# Patient Record
(demographics unavailable — no encounter records)

---

## 2024-12-11 NOTE — PHYSICAL EXAM
[Normal Breath Sounds] : Normal breath sounds [Alert] : alert [Oriented to Person] : oriented to person [Oriented to Place] : oriented to place [Oriented to Time] : oriented to time [2+] : left 2+ [JVD] : no jugular venous distention  [Ankle Swelling (On Exam)] : not present [Abdomen Tenderness] : ~T ~M No abdominal tenderness [No Rash or Lesion] : No rash or lesion [de-identified] : Awake and alert [de-identified] :  No gross motor or sensory deficits. [de-identified] : Appropriate

## 2024-12-11 NOTE — REVIEW OF SYSTEMS
[Recent Weight Loss (___ Lbs)] : recent [unfilled] ~Ulb weight loss [Fever] : no fever [Chills] : no chills [As Noted in HPI] : as noted in HPI

## 2024-12-11 NOTE — HISTORY OF PRESENT ILLNESS
[FreeTextEntry1] : 61 yo female, referred by Dr. Hensley for long standing nausea. The patient states she developed severe nausea and decreased appetite in June.  Patient lost 5-10 Ibs in the since June but is now g  slowly gaining weight. She denies postprandial pain.  She underwent an extensive workup which was largely unremarkable. 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 The patient believes her nausea is secondary to overuse of cannabis. She reports improvement in her symptoms when she stops using marijuana. Patient has a hx of depression and anxiety.

## 2024-12-11 NOTE — END OF VISIT
[FreeTextEntry3] :  All medical record entries made by the sergeyibe were at myJASON KENNETH, direction and personally dictated by me on 12/11/2024. I have reviewed the chart and agree that the record accurately reflects my personal performance of the history, physical exam, assessment, and plan. I have also personally directed, reviewed, and agreed with the chart.

## 2024-12-11 NOTE — ASSESSMENT
[FreeTextEntry1] : 59 yo female with nausea since June. She underwent an MRI ordered by Dr. 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 I reviewed the patient's CTA and do not see a large amount of compression of the celiac axis.  I recommended that she undergo an arterial duplex.    I recommended that she attempt to stop using Marijuana and seeing if her symptoms resolve  Follow up when the results of the us are available.

## 2024-12-11 NOTE — ADDENDUM
[FreeTextEntry1] :  Documented by Barry Sinclair acting as a scribe for MITCHELL GOMEZ on 12/11/2024.

## 2024-12-30 NOTE — PHYSICAL EXAM
[Chaperone Present] : A chaperone was present in the examining room during all aspects of the physical examination [08499] : A chaperone was present during the pelvic exam. [FreeTextEntry2] : Emely Gardner [Awake] : awake [Alert] : alert [Acute Distress] : no acute distress [Distended] : not distended [Oriented x3] : oriented to person, place, and time [Normal] : uterus [No Bleeding] : there was no active vaginal bleeding [Uterine Adnexae] : were not tender and not enlarged [FreeTextEntry4] : + Lesion on left vaginal wall with some apparent vascularity.  Appears to be like a small protruding cyst

## 2024-12-30 NOTE — HEALTH RISK ASSESSMENT
[Audit-CScore] : 0 [[id number]] : 0 [Reports changes in hearing] : Reports no changes in hearing [Reports changes in vision] : Reports no changes in vision [Reports changes in dental health] : Reports no changes in dental health [MammogramDate] : 09/2024 [ColonoscopyDate] : 10/2024

## 2024-12-30 NOTE — REVIEW OF SYSTEMS
Called dad to give message to mom regarding Allergist in [facility]  Phone # [phone] to make an appointment to be seen. Dad verbally understood.  
[Nl] : Integumentary
[Nl] : Integumentary

## 2024-12-30 NOTE — PHYSICAL EXAM
[Chaperone Present] : A chaperone was present in the examining room during all aspects of the physical examination [72437] : A chaperone was present during the pelvic exam. [FreeTextEntry2] : Emely Gardner [Awake] : awake [Alert] : alert [Acute Distress] : no acute distress [Distended] : not distended [Oriented x3] : oriented to person, place, and time [Normal] : uterus [No Bleeding] : there was no active vaginal bleeding [Uterine Adnexae] : were not tender and not enlarged [FreeTextEntry4] : + Lesion on left vaginal wall with some apparent vascularity.  Appears to be like a small protruding cyst

## 2024-12-30 NOTE — HEALTH RISK ASSESSMENT
[Audit-CScore] : 0 [[id number]] : 0 [Reports changes in hearing] : Reports no changes in hearing [Reports changes in vision] : Reports no changes in vision [Reports changes in dental health] : Reports no changes in dental health [MammogramDate] : 09/2024 [ColonoscopyDate] : 10/2024

## 2025-01-09 NOTE — HEALTH RISK ASSESSMENT
[Patient reported mammogram was normal] : Patient reported mammogram was normal [Patient reported PAP Smear was normal] : Patient reported PAP Smear was normal [Patient reported colonoscopy was normal] : Patient reported colonoscopy was normal [HIV test declined] : HIV test declined [Hepatitis C test declined] : Hepatitis C test declined [Very Good] : ~his/her~  mood as very good [No] : In the past 12 months have you used drugs other than those required for medical reasons? No [No falls in past year] : Patient reported no falls in the past year [2] : 1) Little interest or pleasure doing things for more than half of the days (2) [1] : 2) Feeling down, depressed, or hopeless for several days (1) [PHQ-2 Positive] : PHQ-2 Positive [None] : None [With Family] : lives with family [Unemployed] : unemployed [] :  [Fully functional (bathing, dressing, toileting, transferring, walking, feeding)] : Fully functional (bathing, dressing, toileting, transferring, walking, feeding) [Fully functional (using the telephone, shopping, preparing meals, housekeeping, doing laundry, using] : Fully functional and needs no help or supervision to perform IADLs (using the telephone, shopping, preparing meals, housekeeping, doing laundry, using transportation, managing medications and managing finances) [Smoke Detector] : smoke detector [Carbon Monoxide Detector] : carbon monoxide detector [Seat Belt] :  uses seat belt [Sunscreen] : uses sunscreen [Former] : Former [5-9] : 5-9 [> 15 Years] : > 15 Years [# Of Children ___] : has [unfilled] children [de-identified] : follows with psych NP [[id number]] : 3 [Change in mental status noted] : No change in mental status noted [Reports changes in hearing] : Reports no changes in hearing [Reports changes in vision] : Reports no changes in vision [Reports changes in dental health] : Reports no changes in dental health [Travel to Developing Areas] : does not  travel to developing areas [MammogramDate] : 09/24 [PapSmearDate] : 12/24 [BoneDensityDate] : 09/23 [BoneDensityComments] : osteopenia [ColonoscopyDate] : 09/24 [de-identified] : due to depression from 1/24/25

## 2025-01-09 NOTE — REVIEW OF SYSTEMS
[Constipation] : constipation [Anxiety] : anxiety [Depression] : depression [Negative] : Heme/Lymph [FreeTextEntry4] : burning mouth for years

## 2025-01-09 NOTE — PHYSICAL EXAM
[Normal Sclera/Conjunctiva] : normal sclera/conjunctiva [Normal Outer Ear/Nose] : the outer ears and nose were normal in appearance [Normal TMs] : both tympanic membranes were normal [No JVD] : no jugular venous distention [No Lymphadenopathy] : no lymphadenopathy [Supple] : supple [Thyroid Normal, No Nodules] : the thyroid was normal and there were no nodules present [No Respiratory Distress] : no respiratory distress  [No Accessory Muscle Use] : no accessory muscle use [Clear to Auscultation] : lungs were clear to auscultation bilaterally [Normal Rate] : normal rate  [Regular Rhythm] : with a regular rhythm [Normal S1, S2] : normal S1 and S2 [No Murmur] : no murmur heard [No Edema] : there was no peripheral edema [Normal Appearance] : normal in appearance [No Masses] : no palpable masses [No Nipple Discharge] : no nipple discharge [No Axillary Lymphadenopathy] : no axillary lymphadenopathy [Normal] : soft, non-tender, non-distended, no masses palpated, no HSM and normal bowel sounds [Normal Posterior Cervical Nodes] : no posterior cervical lymphadenopathy [Normal Anterior Cervical Nodes] : no anterior cervical lymphadenopathy [No CVA Tenderness] : no CVA  tenderness [No Spinal Tenderness] : no spinal tenderness [No Joint Swelling] : no joint swelling [Grossly Normal Strength/Tone] : grossly normal strength/tone [No Rash] : no rash [Coordination Grossly Intact] : coordination grossly intact [No Focal Deficits] : no focal deficits [Normal Gait] : normal gait [Speech Grossly Normal] : speech grossly normal [Normal Affect] : the affect was normal [Alert and Oriented x3] : oriented to person, place, and time [Normal Mood] : the mood was normal [Normal Insight/Judgement] : insight and judgment were intact [de-identified] : right eye droop, chronic as per pt for most of her life

## 2025-01-09 NOTE — HISTORY OF PRESENT ILLNESS
[FreeTextEntry1] : annual [de-identified] : Pt has been on meds for depression since 29 y/o. She had a bad bout of depression starting in 1/2024 and has not been able to work since then. It stemmed from an issue at work. She states that she is much improved now. She had been to outpatient day program for a total of 10 weeks and that helped. She follows with psych and therapist. Madison Altamirano- psych NP and Kelsi Gomez. Also has issues with nausea and constipation that she feels is related to daily marijuana use. However she has not been able to quit. She is in a program for that as well. No cardiac concerns. She walks for exercise.

## 2025-01-14 NOTE — ASSESSMENT
[TextEntry] : The patient's complaints, including the intermittent bleeding, are likely due to vaginal atrophy and localized trauma.  The findings were discussed with the patient.  For the sake of completeness, a referral for transvaginal ultrasound was provided but it is unlikely that there are any endometrial abnormalities.  No vaginal lesions were identified.
[TextEntry] : The patient's complaints, including the intermittent bleeding, are likely due to vaginal atrophy and localized trauma.  The findings were discussed with the patient.  For the sake of completeness, a referral for transvaginal ultrasound was provided but it is unlikely that there are any endometrial abnormalities.  No vaginal lesions were identified.
Admission

## 2025-01-14 NOTE — PHYSICAL EXAM
[Chaperone Present] : A chaperone was present in the examining room during all aspects of the physical examination [No Lymphadenopathy] : no lymphadenopathy [Soft] : soft [Non-tender] : non-tender [No HSM] : No HSM [No Mass] : no mass [Atrophy] : atrophy [Nl Sphincter Tone] : normal sphincter tone [48011] : A chaperone was present during the pelvic exam. [Vulvar Atrophy] : vulvar atrophy [Labia Majora] : normal [Labia Minora] : normal [Normal] : normal [Anteversion] : anteverted [Uterine Adnexae] : normal [FreeTextEntry2] : Clay Chaudhari [FreeTextEntry3] : No thyroid nodules [FreeTextEntry1] : No erythema, lesions, loss of pigmentation.  [FreeTextEntry4] : Cleat; no blood present. Swab done for pathogens. No visible or palpable lesions or cysts [FreeTextEntry6] : small [FreeTextEntry9] : No masses

## 2025-01-14 NOTE — PHYSICAL EXAM
[Chaperone Present] : A chaperone was present in the examining room during all aspects of the physical examination [No Lymphadenopathy] : no lymphadenopathy [Soft] : soft [Non-tender] : non-tender [No HSM] : No HSM [No Mass] : no mass [Atrophy] : atrophy [Nl Sphincter Tone] : normal sphincter tone [[id number]] : A chaperone was present during the pelvic exam. [Vulvar Atrophy] : vulvar atrophy [Labia Majora] : normal [Labia Minora] : normal [Normal] : normal [Anteversion] : anteverted [Uterine Adnexae] : normal [FreeTextEntry2] : Clay Chaudhari [FreeTextEntry3] : No thyroid nodules [FreeTextEntry1] : No erythema, lesions, loss of pigmentation.  [FreeTextEntry4] : Cleat; no blood present. Swab done for pathogens. No visible or palpable lesions or cysts [FreeTextEntry6] : small [FreeTextEntry9] : No masses

## 2025-01-14 NOTE — HISTORY OF PRESENT ILLNESS
[Previously active] : previously active [Men] : men [Vaginal] : vaginal [TextBox_4] :  for gynecologic care.  2024 note indicates:  that she has a lot of vaginal discomfort and bleeding upon any sort of penetration which she discovered using a vaginal maneuver to help alleviate her constipation. She also states that she felt a shift in her uterus position and a cylindrical structure in her vaginal canal.  The examination indicates a lesion on the left vaginal wall with some vascularity/small protruding cyst. Pt c/o vag dryness. Some contact bleeding. No d/c or lesions. Has constip - & bleeds when she puts a finger in the vag & tries to mobilize the stool. Tried to have intercourse several yrs ago & had to stop due to pain. [Mammogramdate] : 9/4/24 [TextBox_19] : Ivy lifetime risk: 3.9%.  Scattered fibroglandular density with no evidence of malignancy.  Breast arterial calcification: Grade 0.  BI-RADS 1-negative. [PapSmeardate] : 12/30/2024 [TextBox_31] : NILM; HPV neg [BoneDensityDate] : 9/1/2023 [TextBox_37] : T-score -2.0 at L1-4 (Z-score -0.7).  Right femoral neck -2.1 (Z-score -0.9).  Right total hip -1.9.  Osteopenia with a 10-year fracture risk of 9.5 and 1.2%. [ColonoscopyDate] : 9/5/2024 [TextBox_43] : 2 tubular adenoma's + 1 hyperplastic polyp. Dr. AYDEN Hensley. Re[eat om 5 yrs. [FreeTextEntry2] :

## 2025-01-14 NOTE — PLAN
[FreeTextEntry1] : Over the counter vaginal moisturizer (Replens) and lubricants were discussed.  Vaginal estrogen was also discussed.  Patient indicated that she would like to try vaginal estrogen cream.  Its use was explained.

## 2025-01-22 NOTE — DATA REVIEWED
[FreeTextEntry1] : Mesenteric ultrasound - personally reviewed - patent celiac and SMA, celiac with significant increase in velocity with expiration

## 2025-01-22 NOTE — END OF VISIT
[FreeTextEntry3] :  All medical record entries made by the sergeyibe were at JASON arnold KENNETH, direction and personally dictated by me on 1/22/2025. I have reviewed the chart and agree that the record accurately reflects my personal performance of the history, physical exam, assessment, and plan. I have also personally directed, reviewed, and agreed with the chart.

## 2025-01-22 NOTE — ASSESSMENT
[FreeTextEntry1] : 61 yo female with nausea since June. She underwent an MRI ordered by Dr. 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 She underwent an arterial duplex to confirm the diagnosis which demonstrated  widely patent SMA and celiac arteries. She had an increase in the velocity of her celiac artery with deep expiration.   I explained to the patient that I am unsure if the compression of celiac artery is what's causing the nausea. While she has nausea she has no weight loss and no post prandial pain.  I recommended the patient to make an appointment with a general surgeon for further evaluation. She may also benefit from an evaluation for a celiac plexus block. She is unsure if she would like to proceed at this time.  She was given a list of surgeons and will make an appointment if she wishes to proceed.

## 2025-01-22 NOTE — ADDENDUM
[FreeTextEntry1] :  Documented by Barry Sinclair acting as a scribe for MITCHELL GOMEZ on 1/22/2025.

## 2025-01-22 NOTE — PHYSICAL EXAM
[Normal Breath Sounds] : Normal breath sounds [No Rash or Lesion] : No rash or lesion [Alert] : alert [Oriented to Person] : oriented to person [Oriented to Place] : oriented to place [Oriented to Time] : oriented to time [JVD] : no jugular venous distention  [Ankle Swelling (On Exam)] : not present [Abdomen Tenderness] : ~T ~M No abdominal tenderness [de-identified] : Awake and alert [de-identified] :  No gross motor or sensory deficits. [de-identified] : Appropriate

## 2025-01-22 NOTE — HISTORY OF PRESENT ILLNESS
[FreeTextEntry1] : 61 yo female, referred by Dr. Hensley for long standing nausea. The patient states she developed severe nausea and decreased appetite in June.  Patient lost 5-10 Ibs in the since June but is now g  slowly gaining weight. She denies postprandial pain.  She underwent an extensive workup which was largely unremarkable. 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 The patient believes her nausea is secondary to overuse of cannabis. She reports improvement in her symptoms when she stops using marijuana. Patient has a hx of depression and anxiety.    [de-identified] : 60 year old female returns in follow up. She continues to report nausea. She denies postprandial pain.  She underwent an extensive workup which was largely unremarkable. 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 The patient believes her nausea is secondary to overuse of cannabis. She reports improvement in her symptoms when she stops using marijuana.  Patient is attempting to stop using the cannabis. Patient has a hx of depression and anxiety. She underwent mesenteric arterial duplex and is here to discuss the results and additional treatment options.

## 2025-01-22 NOTE — REVIEW OF SYSTEMS
[As Noted in HPI] : as noted in HPI [Fever] : no fever [Chills] : no chills [Recent Weight Loss (___ Lbs)] : no recent weight loss [FreeTextEntry7] : Nausea

## 2025-03-11 NOTE — ASSESSMENT
[FreeTextEntry1] : Chronic nausea, constipation - Unclear etiology. DDx includes medication induced nausea vs chronic nausea vomiting syndrome vs cannabis hyperemesis syndrome. We discussed Trintellix and its main side effect is nausea especially in combination with Wellbutrin since Wellbutrin can exacerbate the effect of Trintellix. Given interactions with other antidepressants, would not recommend trial of Nortriptyline.  - Continue marijuana abstinence. - Continue PRN Zofran 8mg every 8 hours.  - Start Motegrity to promote gastric emptying and combat constipation.  - Increase water intake to minimum 40 ounces daily.  - GES - Follow-up scheduled with Dr. Hensley in May 2025.  
patient

## 2025-03-11 NOTE — HISTORY OF PRESENT ILLNESS
[FreeTextEntry1] : Exam Date: 	  10/25/24					 Exam: 	MRI MRCP WAW IC					 Order#:	[id number]					                CLINICAL INFORMATION: Nausea. Weight loss. Family history of pancreatic cancer.  COMPARISON: Contrast-enhanced CT of the abdomen and pelvis August 26, 2024.  CONTRAST/COMPLICATIONS: IV Contrast: Gadavist  7.5 cc administered   0 cc discarded Oral Contrast: NONE Complications: None reported at time of study completion  PROCEDURE:  MRI of the abdomen was performed. Mild respiratory motion artifact on multiple sequences. MRCP was performed.  FINDINGS: LOWER CHEST: Within normal limits.  LIVER: Within normal limits.  BILE DUCTS: There is no intra or extrahepatic biliary duct dilatation. The common bile duct measures 6 mm. There is no stricturing or beading of the biliary tree. There is no evidence of filling defect in the common bile duct to suggest choledocholithiasis. GALLBLADDER: Within normal limits. SPLEEN: Within normal limits. PANCREAS: There is mild fatty replacement of the pancreas. No discrete pancreatic mass is identified. There are punctate dilated side branch ducts in the body and tail. The pancreatic duct is normal in course and caliber. ADRENALS: Within normal limits. KIDNEYS/URETERS: Again is noted mild left renal cortical thinning, compatible with scarring. Tiny cyst in the upper pole of the right kidney. Otherwise, within normal limits.  VISUALIZED PORTIONS: BOWEL: Within normal limits.  PERITONEUM: No ascites. VESSELS: The proximal superior mesenteric artery is diminutive. There is mild extrinsic mass effect of the arcuate ligament upon the proximal celiac artery. RETROPERITONEUM/LYMPH NODES: No lymphadenopathy.   ABDOMINAL WALL: Within normal limits. BONES: Within normal limits.   IMPRESSION: No evidence of pancreatic mass. Diminutive proximal SMA. 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